# Patient Record
Sex: MALE | Race: WHITE | URBAN - METROPOLITAN AREA
[De-identification: names, ages, dates, MRNs, and addresses within clinical notes are randomized per-mention and may not be internally consistent; named-entity substitution may affect disease eponyms.]

---

## 2017-01-18 ENCOUNTER — EMERGENCY (EMERGENCY)
Facility: HOSPITAL | Age: 32
LOS: 1 days | Discharge: HOME | End: 2017-01-18
Attending: EMERGENCY MEDICINE
Payer: MEDICAID

## 2017-01-18 VITALS
HEIGHT: 69 IN | OXYGEN SATURATION: 98 % | SYSTOLIC BLOOD PRESSURE: 119 MMHG | HEART RATE: 88 BPM | TEMPERATURE: 98 F | RESPIRATION RATE: 20 BRPM | DIASTOLIC BLOOD PRESSURE: 84 MMHG | WEIGHT: 169.98 LBS

## 2017-01-18 DIAGNOSIS — R25.2 CRAMP AND SPASM: ICD-10-CM

## 2017-01-18 DIAGNOSIS — T42.6X1A POISONING BY OTHER ANTIEPILEPTIC AND SEDATIVE-HYPNOTIC DRUGS, ACCIDENTAL (UNINTENTIONAL), INITIAL ENCOUNTER: ICD-10-CM

## 2017-01-18 PROCEDURE — 99283 EMERGENCY DEPT VISIT LOW MDM: CPT

## 2017-01-18 NOTE — ED STATDOCS - OBJECTIVE STATEMENT
30 y/o M pt presents to ED c/o leg cramping starting today. Pt states he ran out of his Prozac and took his friends Neurontin 2 pills 3 days ago 3 pills yesterday and 1 pill today. No chest pain, SOB. No abdominal pain, nausea, vomiting. No headaches, dizziness. No further complaints at this time.

## 2017-01-18 NOTE — ED STATDOCS - NS ED MD SCRIBE ATTENDING SCRIBE SECTIONS
HIV/REVIEW OF SYSTEMS/VITAL SIGNS( Pullset)/PHYSICAL EXAM/PAST MEDICAL/SURGICAL/SOCIAL HISTORY/HISTORY OF PRESENT ILLNESS/DISPOSITION/INTAKE ASSESSMENT/SCREENINGS

## 2017-01-18 NOTE — ED ADULT TRIAGE NOTE - CHIEF COMPLAINT QUOTE
pt states he ran out of neurontin  which he took 2 days ago. pt stated his" feet, neck and tongue started seizing". pt reportedly found a pill an took it and symptoms resolved for 30 mins

## 2017-01-18 NOTE — ED STATDOCS - PROGRESS NOTE DETAILS
was informed that grandmother took patient and walked out of ED  charge nurse Amanda made aware immediately and stated she will follow up

## 2017-08-01 ENCOUNTER — OUTPATIENT (OUTPATIENT)
Dept: OUTPATIENT SERVICES | Facility: HOSPITAL | Age: 32
LOS: 1 days | End: 2017-08-01
Payer: MEDICAID

## 2017-08-02 DIAGNOSIS — R69 ILLNESS, UNSPECIFIED: ICD-10-CM

## 2017-09-01 PROCEDURE — G9001: CPT
